# Patient Record
Sex: FEMALE | Employment: UNEMPLOYED | URBAN - METROPOLITAN AREA
[De-identification: names, ages, dates, MRNs, and addresses within clinical notes are randomized per-mention and may not be internally consistent; named-entity substitution may affect disease eponyms.]

---

## 2020-12-31 ENCOUNTER — HOSPITAL ENCOUNTER (OUTPATIENT)
Facility: CLINIC | Age: 40
Setting detail: OBSERVATION
Discharge: HOME | End: 2021-01-01
Attending: FAMILY MEDICINE | Admitting: FAMILY MEDICINE
Payer: COMMERCIAL

## 2020-12-31 DIAGNOSIS — F19.99 DRUG-INDUCED MENTAL DISORDER (H): ICD-10-CM

## 2020-12-31 DIAGNOSIS — R41.82 ALTERED MENTAL STATUS, UNSPECIFIED ALTERED MENTAL STATUS TYPE: ICD-10-CM

## 2020-12-31 DIAGNOSIS — Z20.828 CONTACT WITH AND (SUSPECTED) EXPOSURE TO OTHER VIRAL COMMUNICABLE DISEASES: ICD-10-CM

## 2020-12-31 DIAGNOSIS — F39 UNSPECIFIED MOOD (AFFECTIVE) DISORDER (H): ICD-10-CM

## 2020-12-31 LAB
BASOPHILS # BLD AUTO: 0 10E9/L (ref 0–0.2)
BASOPHILS NFR BLD AUTO: 0.3 %
DIFFERENTIAL METHOD BLD: ABNORMAL
EOSINOPHIL # BLD AUTO: 0 10E9/L (ref 0–0.7)
EOSINOPHIL NFR BLD AUTO: 0.1 %
ERYTHROCYTE [DISTWIDTH] IN BLOOD BY AUTOMATED COUNT: 12.9 % (ref 10–15)
HCT VFR BLD AUTO: 38.5 % (ref 35–47)
HGB BLD-MCNC: 13.1 G/DL (ref 11.7–15.7)
IMM GRANULOCYTES # BLD: 0 10E9/L (ref 0–0.4)
IMM GRANULOCYTES NFR BLD: 0.4 %
LYMPHOCYTES # BLD AUTO: 1.5 10E9/L (ref 0.8–5.3)
LYMPHOCYTES NFR BLD AUTO: 22.9 %
MCH RBC QN AUTO: 33.2 PG (ref 26.5–33)
MCHC RBC AUTO-ENTMCNC: 34 G/DL (ref 31.5–36.5)
MCV RBC AUTO: 98 FL (ref 78–100)
MONOCYTES # BLD AUTO: 0.7 10E9/L (ref 0–1.3)
MONOCYTES NFR BLD AUTO: 10.9 %
NEUTROPHILS # BLD AUTO: 4.4 10E9/L (ref 1.6–8.3)
NEUTROPHILS NFR BLD AUTO: 65.4 %
NRBC # BLD AUTO: 0 10*3/UL
NRBC BLD AUTO-RTO: 0 /100
PLATELET # BLD AUTO: 177 10E9/L (ref 150–450)
RBC # BLD AUTO: 3.94 10E12/L (ref 3.8–5.2)
WBC # BLD AUTO: 6.7 10E9/L (ref 4–11)

## 2020-12-31 PROCEDURE — 99218 PR INITIAL OBSERVATION CARE,LEVEL I: CPT | Performed by: FAMILY MEDICINE

## 2020-12-31 PROCEDURE — 84443 ASSAY THYROID STIM HORMONE: CPT | Performed by: FAMILY MEDICINE

## 2020-12-31 PROCEDURE — 80307 DRUG TEST PRSMV CHEM ANLYZR: CPT | Performed by: FAMILY MEDICINE

## 2020-12-31 PROCEDURE — C9803 HOPD COVID-19 SPEC COLLECT: HCPCS | Performed by: FAMILY MEDICINE

## 2020-12-31 PROCEDURE — 84703 CHORIONIC GONADOTROPIN ASSAY: CPT | Performed by: FAMILY MEDICINE

## 2020-12-31 PROCEDURE — 85025 COMPLETE CBC W/AUTO DIFF WBC: CPT | Performed by: FAMILY MEDICINE

## 2020-12-31 PROCEDURE — 96372 THER/PROPH/DIAG INJ SC/IM: CPT | Performed by: FAMILY MEDICINE

## 2020-12-31 PROCEDURE — 80053 COMPREHEN METABOLIC PANEL: CPT | Performed by: FAMILY MEDICINE

## 2020-12-31 PROCEDURE — 99285 EMERGENCY DEPT VISIT HI MDM: CPT | Mod: 25 | Performed by: FAMILY MEDICINE

## 2020-12-31 PROCEDURE — 87635 SARS-COV-2 COVID-19 AMP PRB: CPT | Performed by: FAMILY MEDICINE

## 2020-12-31 PROCEDURE — 250N000011 HC RX IP 250 OP 636: Performed by: FAMILY MEDICINE

## 2020-12-31 RX ORDER — OLANZAPINE 10 MG/2ML
10 INJECTION, POWDER, FOR SOLUTION INTRAMUSCULAR ONCE
Status: COMPLETED | OUTPATIENT
Start: 2020-12-31 | End: 2020-12-31

## 2020-12-31 RX ADMIN — OLANZAPINE 10 MG: 10 INJECTION, POWDER, LYOPHILIZED, FOR SOLUTION INTRAMUSCULAR at 22:41

## 2020-12-31 NOTE — ED AVS SNAPSHOT
[facility]  [address]  [place] [address]  Phone: [phone]  Fax: [phone]                                    Vannessa Youssef   MRN: [id number]    Department: [facility]   Date of Visit: 12/31/2020           After Visit Summary Signature Page    I have received my discharge instructions, and my questions have been answered. I have discussed any challenges I see with this plan with the nurse or doctor.    ..........................................................................................................................................  Patient/Patient Representative Signature      ..........................................................................................................................................  Patient Representative Print Name and Relationship to Patient    ..................................................               ................................................  Date                                   Time    ..........................................................................................................................................  Reviewed by Signature/Title    ...................................................              ..............................................  Date                                               Time          22EPIC Rev 08/18

## 2021-01-01 VITALS
HEART RATE: 113 BPM | DIASTOLIC BLOOD PRESSURE: 70 MMHG | TEMPERATURE: 97.6 F | OXYGEN SATURATION: 100 % | SYSTOLIC BLOOD PRESSURE: 112 MMHG | RESPIRATION RATE: 12 BRPM

## 2021-01-01 LAB
ALBUMIN SERPL-MCNC: 4 G/DL (ref 3.4–5)
ALP SERPL-CCNC: 58 U/L (ref 40–150)
ALT SERPL W P-5'-P-CCNC: 44 U/L (ref 0–50)
ANION GAP SERPL CALCULATED.3IONS-SCNC: 10 MMOL/L (ref 3–14)
AST SERPL W P-5'-P-CCNC: 45 U/L (ref 0–45)
BILIRUB SERPL-MCNC: 0.6 MG/DL (ref 0.2–1.3)
BUN SERPL-MCNC: 32 MG/DL (ref 7–30)
CALCIUM SERPL-MCNC: 9.3 MG/DL (ref 8.5–10.1)
CHLORIDE SERPL-SCNC: 109 MMOL/L (ref 94–109)
CO2 SERPL-SCNC: 24 MMOL/L (ref 20–32)
CREAT SERPL-MCNC: 0.98 MG/DL (ref 0.52–1.04)
GFR SERPL CREATININE-BSD FRML MDRD: 72 ML/MIN/{1.73_M2}
GLUCOSE SERPL-MCNC: 102 MG/DL (ref 70–99)
HCG SERPL QL: NEGATIVE
LABORATORY COMMENT REPORT: NORMAL
POTASSIUM SERPL-SCNC: 3 MMOL/L (ref 3.4–5.3)
PROT SERPL-MCNC: 7.9 G/DL (ref 6.8–8.8)
SARS-COV-2 RNA SPEC QL NAA+PROBE: NEGATIVE
SODIUM SERPL-SCNC: 143 MMOL/L (ref 133–144)
SPECIMEN SOURCE: NORMAL
TSH SERPL DL<=0.005 MIU/L-ACNC: 1.76 MU/L (ref 0.4–4)

## 2021-01-01 PROCEDURE — G0378 HOSPITAL OBSERVATION PER HR: HCPCS

## 2021-01-01 PROCEDURE — 90791 PSYCH DIAGNOSTIC EVALUATION: CPT

## 2021-01-01 PROCEDURE — 99217 PR OBSERVATION CARE DISCHARGE: CPT | Performed by: EMERGENCY MEDICINE

## 2021-01-01 RX ORDER — LORAZEPAM 1 MG/1
1 TABLET ORAL ONCE
Status: DISCONTINUED | OUTPATIENT
Start: 2021-01-01 | End: 2021-01-01 | Stop reason: HOSPADM

## 2021-01-01 RX ORDER — POTASSIUM CHLORIDE 1.5 G/1.58G
40 POWDER, FOR SOLUTION ORAL ONCE
Status: DISCONTINUED | OUTPATIENT
Start: 2021-01-01 | End: 2021-01-01 | Stop reason: HOSPADM

## 2021-01-01 RX ORDER — QUETIAPINE FUMARATE 100 MG/1
100 TABLET, FILM COATED ORAL ONCE
Status: DISCONTINUED | OUTPATIENT
Start: 2021-01-01 | End: 2021-01-01 | Stop reason: HOSPADM

## 2021-01-01 NOTE — ED NOTES
After multiple phone call and then calling [facility] police dispatch a number and address was obtained of the shelter were the Pt was staying.  Shelter was contacted and stated they were willing to take the Pt back.  Pt cleared to be discharge. Cab called to take the Pt back to the shelter.  Pt stated understanding of plan    Shelter at [facility]  [address]  [facility]  [phone]

## 2021-01-01 NOTE — ED NOTES
Pt arrived to ED via EMS and PD in handcuffs, pt screaming, swearing loudly, not making sense, not listening to staff request to go into a room. Per EMS report, pt was found at her shelter very agitated, hitting herself with a , drug paraphernalia found in pt's room. Pt was unable to be verbally deescalated; code 21 called and pt taken down to floor mat and 5-point restraints initiated d/t risk for self-harm. Dr. Barragan present and assessed pt. Orders rec'd.

## 2021-01-01 NOTE — ED PROVIDER NOTES
ED Observation History and Physical  [facility]  Observation Initiation Date: Dec 31, 2020    Vannessa Youssef MRN: [id number]   Age: 40 year old YOB: 1980     History     Chief Complaint   Patient presents with     Altered Mental Status     HPI  Vannessa Youssef is a 40 year old female who presents to the ED today via EMS for altered mental status. Per EMS they were called by the shelter ([facility] in [place]). Shelter staff heard noises coming from her room and found the patient with erratic behavior and SIB. She was hitting her leg with a . When EMS arrived she would not cooperate and was handcuffed and brought here. EMS also noted that there may have been a crack pipe in her room.  On arrival here in the emergency room patient was agitated screaming yelling at staff has not been physically violent but appeared to possibly want to hurt her self she was taken out of handcuffs that have been placed by [facility] and was then placed in restraints given IM Zyprexa in room 14 on arrival here in the emergency room.  Patient is now more cooperative and answering questions to a greater degree but is still altered.     Past Medical History  History reviewed. No pertinent past medical history.  History reviewed. No pertinent surgical history.  No current outpatient medications on file.    Not on File  Family History  No family history on file.  Social History   Social History     Tobacco Use     Smoking status: Unknown If Ever Smoked   Substance Use Topics     Alcohol use: None     Drug use: None      Past medical history, past surgical history, medications, allergies, family history, and social history were reviewed with the patient. No additional pertinent items.       Review of Systems  A complete review of systems was attempted but limited due to altered mental status.    Physical Exam   BP: 112/70  Pulse: 113  Temp: 97.6  F (36.4  C)  Resp: 12  SpO2: 100  %    Physical Exam  General: . Appears stated age.   HENT: MMM, no oropharyngeal lesions  Eyes: PERRL, normal sclerae   Cardio: Regular rate, extremities well perfused  Resp: Normal work of breathing, normal respiratory rate  Neuro: alert. CN II-XII grossly intact. Grossly normal strength and sensation in all extremities.   MSK: no deformities.   Integumentary/Skin: no rash visualized, normal color  Psychiatric:         Mood and Affect: Affect is labile and angry.         Speech: Speech is rapid and pressured.         Behavior: Behavior is agitated and combative.         Thought Content: Thought content is paranoid.         ED Course      Procedures       Patient will be seen and evaluated by the  during the night.               Labs Ordered and Resulted from Time of ED Arrival Up to the Time of Departure from the ED   CBC WITH PLATELETS DIFFERENTIAL - Abnormal; Notable for the following components:       Result Value    MCH 33.2 (*)     All other components within normal limits   COMPREHENSIVE METABOLIC PANEL   TSH   SARS-COV-2 (COVID-19) VIRUS RT-PCR   HCG QUALITATIVE   DRUG SCREEN COMPREHENSIVE BLOOD ([facility])   DRUG ABUSE SCREEN 6 CHEM DEP URINE ([facility])   VITAL SIGNS            Assessments & Plan (with Medical Decision Making)   Patient presenting with altered mental status. Nursing notes reviewed.    DEC assessment not yet completed. See separate DEC note from tomorrow's date for details on the assessment.    During the observation period, the patient did require medications for agitation, and did require restraints/seclusion for patient and/or provider safety.     The patient was found to have a psychiatric condition that would benefit from an observation stay in the emergency department for further psychiatric stabilization and/or coordination of a safe disposition. The observation plan includes serial assessments of psychiatric condition, potential administration of medications if indicated, further  disposition pending the patient's psychiatric course during the monitoring period.     Preliminary diagnosis:  Altered mental status unclear on initial evaluation whether or not this may be drug-induced versus underlying mental health issues patient will be reevaluated while here in the emergency room overnight.    --  Marc Barragan MD   [facility] EMERGENCY DEPARTMENT  12/31/2020      Marc Barragan MD  12/31/20 5710

## 2021-01-01 NOTE — ED NOTES
Pt offered meds that were ordered by the provider and refused and stated she would take Ritalin.  ED provider made aware.

## 2021-01-01 NOTE — ED NOTES
"Patient was calm and sleeping. Patient consented to appropriate behavior. 5 point restraints were removed by staff. Food and drink were offered. Patient asked for water. Bathroom was offered and patient declined. Patient reported \"I want to go home\" Writer informed patient she would have to hang out in the ER for evaluation for awhile and patient reported \"ok\" patient went back to sleep  "

## 2021-01-01 NOTE — ED NOTES
Within an hour after restraint an in person face to face assessment was completed at 2300, including an evaluation of the patient's immediate reaction to the intervention, behavioral assessment and review/assessment of history, drugs and medications, recent labs, etc., and behavioral condition.  The patient experienced: No adverse physical outcome from seclusion/restraint initiation.  The intervention of restraint or seclusion needs to continue.     Marc Barragan MD  01/01/21 0010

## 2021-01-01 NOTE — ED NOTES
Bed: ED14  Expected date: 12/31/20  Expected time: 10:22 PM  Means of arrival:   Comments:  30F Psych Eval

## 2021-01-01 NOTE — ED NOTES
Patient wakes yelling loudly at staff. After much encouragement from staff to stop yelling patient stops with reminders from staff. Wanting to leave and speak to MD.

## 2021-01-01 NOTE — ED NOTES
Vannessa presented to the ER with agitation the previous evening.  After receiving Zyprexa 10mg IM, she rested in the ER uneventfully.  After a period of time, she was assessed by the [facility] and she had continued improvements in her mental status and agitation.  Vannessa will be discharged to a shelter.  She is not suicidal and has no thoughts of harming others.  She will be discharged to a shelter and will follow up with her primary care team.       Elkin Bernard MD  01/01/21 [id number]

## 2021-01-01 NOTE — ED NOTES
ED provider went to talk to the Pt and the Pt became upset and refusing to talk to the . Pt slightly redirectable after much encouragement.  Pt speech is rapid. Pt is making very dramatic hand motions with dance like foot movements when talking.

## 2021-01-04 LAB
ACETAMINOPHEN QUAL: NEGATIVE
AMOBARBITAL QUAL: NEGATIVE
BARBITAL QUAL: NEGATIVE
BUTABARBITAL QUAL: NEGATIVE
BUTALBITAL QUAL: NEGATIVE
CAFFEINE QUAL: NEGATIVE
CARBAMAZEPINE QUAL: NEGATIVE
CARISOPRODOL QUAL: NEGATIVE
CHLORPROPAMIDE UR-MCNC: NEGATIVE UG/ML
DRUGS SERPL SCN: NEGATIVE
ETHCLORVYNOL QUAL: NEGATIVE
ETHINAMATE QUAL: NEGATIVE
ETHOSUXIMIDE QUAL: NEGATIVE
ETHOTOIN QUAL: NEGATIVE
GLUTETHIMIDE QUAL: NEGATIVE
IBUPROFEN QUAL: NEGATIVE
MEPHENYTOIN QUAL: NEGATIVE
MEPHOBARBITAL QUAL: NEGATIVE
MEPROBAMATE QUAL: NEGATIVE
METHAQUALONE QUAL: NEGATIVE
METHARBITAL QUAL: NEGATIVE
METHSUXIMIDE QUAL: NEGATIVE
METHYPRYLON QUAL: NEGATIVE
PENTOBARBITAL QUAL: NEGATIVE
PHENACETIN QUAL: NEGATIVE
PHENOBARBITAL QUAL: NEGATIVE
PHENSUXIMIDE QUAL: NEGATIVE
PHENYTOIN QUAL: NEGATIVE
PRIMIDONE QUAL: NEGATIVE
SALICYLATE QUAL: NEGATIVE
SECOBARBITAL QUAL: NEGATIVE
TALBUTAL QUAL: NEGATIVE
THEOPHYLLINE QUAL: NEGATIVE
THIOPENTAL QUAL: NEGATIVE
TYBAMATE QUAL: NEGATIVE
VALPROIC ACID QUAL: NEGATIVE